# Patient Record
Sex: MALE | Race: OTHER | URBAN - METROPOLITAN AREA
[De-identification: names, ages, dates, MRNs, and addresses within clinical notes are randomized per-mention and may not be internally consistent; named-entity substitution may affect disease eponyms.]

---

## 2024-06-11 ENCOUNTER — OFFICE VISIT (OUTPATIENT)
Age: 29
End: 2024-06-11

## 2024-06-11 DIAGNOSIS — M25.331 CARPAL INSTABILITY OF RIGHT WRIST: Primary | ICD-10-CM

## 2024-06-11 DIAGNOSIS — M25.531 RIGHT WRIST PAIN: ICD-10-CM

## 2024-06-11 DIAGNOSIS — M79.641 RIGHT HAND PAIN: ICD-10-CM

## 2024-06-11 NOTE — PROGRESS NOTES
Orthopaedic Hand Clinic Note    Name: Dustin Abbasi  YOB: 1995  Age: 28 y.o.  Gender: male  MRN: [id number]      CC: Patient referred for evaluation of upper extremity pain    HPI: Dustin Abbasi is a 28 y.o. male Right hand dominant with a chief complaint of right wrist pain, patient has played tennis for over 16 years, he does not remember any acute injury that caused the wrist pain but he reports over the past year he has had worsening pain on the dorsal and radial aspect of the wrist, this hurts mainly to play tennis and sometimes to grasp and  stuff, he rested the wrist for several months while he recovers from arthroscopic knee surgery but he reports that as soon as he went back he started having pain again.      ROS/Meds/PSH/PMH/FH/SH: I personally reviewed the patients standard intake form.  Pertinents are discussed in the HPI    Physical Examination:  General: Awake and alert.  HEENT: Normocephalic, atraumatic  CV/Pulm: Breathing even and unlabored  Skin: No obvious rashes noted.  Lymphatic: No obvious evidence of lymphedema or lymphadenopathy    Musculoskeletal Exam:  Examination on the right upper extremity demonstrates cap refill < 5 seconds in all fingers, tenderness palpation of the radiocarpal joint globally, this is worse on the dorsal and radial aspect especially the anatomic snuffbox, there is pain with scaphoid shift test but no clunks, normal sensation in all fingers, no pain on the TFCC fovea, none of the DRUJ, some discomfort with triquetrum shuck test.    Imaging / Electrodiagnostic Tests:     right Hand XR: AP, Lateral, Oblique and Thumb CMC joint     Clinical Indication:  1. Right wrist pain    2. Right hand pain    3. Carpal instability of right wrist           Report: AP, lateral, oblique and thumb CMC joint hand XRs demonstrates flexed position of the lunate as well as disruption of Gilula's line between the lunate and the triquetrum, this is highly

## 2024-06-19 DIAGNOSIS — M25.331 CARPAL INSTABILITY OF RIGHT WRIST: Primary | ICD-10-CM

## 2024-07-09 ENCOUNTER — OFFICE VISIT (OUTPATIENT)
Age: 29
End: 2024-07-09
Payer: COMMERCIAL

## 2024-07-09 DIAGNOSIS — M65.9 TENOSYNOVITIS: ICD-10-CM

## 2024-07-09 DIAGNOSIS — M19.90 INFLAMMATORY ARTHROPATHY: Primary | ICD-10-CM

## 2024-07-09 DIAGNOSIS — M19.90 INFLAMMATORY ARTHROPATHY: ICD-10-CM

## 2024-07-09 DIAGNOSIS — M25.331: ICD-10-CM

## 2024-07-09 LAB
ALBUMIN SERPL-MCNC: 4.6 G/DL (ref 3.5–5)
ALBUMIN/GLOB SERPL: 1.7 (ref 1–1.9)
ALP SERPL-CCNC: 52 U/L (ref 40–129)
ALT SERPL-CCNC: 22 U/L (ref 12–65)
ANION GAP SERPL CALC-SCNC: 15 MMOL/L (ref 9–18)
AST SERPL-CCNC: 24 U/L (ref 15–37)
BASOPHILS # BLD: 0.1 K/UL (ref 0–0.2)
BASOPHILS NFR BLD: 1 % (ref 0–2)
BILIRUB SERPL-MCNC: 1 MG/DL (ref 0–1.2)
BUN SERPL-MCNC: 16 MG/DL (ref 6–23)
CALCIUM SERPL-MCNC: 9.9 MG/DL (ref 8.8–10.2)
CHLORIDE SERPL-SCNC: 102 MMOL/L (ref 98–107)
CO2 SERPL-SCNC: 24 MMOL/L (ref 20–28)
CREAT SERPL-MCNC: 1.08 MG/DL (ref 0.8–1.3)
DIFFERENTIAL METHOD BLD: ABNORMAL
EOSINOPHIL # BLD: 0.1 K/UL (ref 0–0.8)
EOSINOPHIL NFR BLD: 2 % (ref 0.5–7.8)
ERYTHROCYTE [DISTWIDTH] IN BLOOD BY AUTOMATED COUNT: 12.7 % (ref 11.9–14.6)
ERYTHROCYTE [SEDIMENTATION RATE] IN BLOOD: 1 MM/HR (ref 0–20)
GLOBULIN SER CALC-MCNC: 2.7 G/DL (ref 2.3–3.5)
GLUCOSE SERPL-MCNC: 116 MG/DL (ref 70–99)
HCT VFR BLD AUTO: 49.2 % (ref 41.1–50.3)
HGB BLD-MCNC: 17.6 G/DL (ref 13.6–17.2)
IMM GRANULOCYTES # BLD AUTO: 0 K/UL (ref 0–0.5)
IMM GRANULOCYTES NFR BLD AUTO: 0 % (ref 0–5)
LYMPHOCYTES # BLD: 1.6 K/UL (ref 0.5–4.6)
LYMPHOCYTES NFR BLD: 25 % (ref 13–44)
MCH RBC QN AUTO: 30.1 PG (ref 26.1–32.9)
MCHC RBC AUTO-ENTMCNC: 35.8 G/DL (ref 31.4–35)
MCV RBC AUTO: 84.1 FL (ref 82–102)
MONOCYTES # BLD: 0.3 K/UL (ref 0.1–1.3)
MONOCYTES NFR BLD: 5 % (ref 4–12)
NEUTS SEG # BLD: 4.1 K/UL (ref 1.7–8.2)
NEUTS SEG NFR BLD: 66 % (ref 43–78)
NRBC # BLD: 0 K/UL (ref 0–0.2)
PLATELET # BLD AUTO: 276 K/UL (ref 150–450)
PMV BLD AUTO: 9.5 FL (ref 9.4–12.3)
POTASSIUM SERPL-SCNC: 3.8 MMOL/L (ref 3.5–5.1)
PROT SERPL-MCNC: 7.3 G/DL (ref 6.3–8.2)
RBC # BLD AUTO: 5.85 M/UL (ref 4.23–5.6)
SODIUM SERPL-SCNC: 140 MMOL/L (ref 136–145)
URATE SERPL-MCNC: 7.7 MG/DL (ref 3.9–8.2)
WBC # BLD AUTO: 6.2 K/UL (ref 4.3–11.1)

## 2024-07-09 PROCEDURE — 4004F PT TOBACCO SCREEN RCVD TLK: CPT | Performed by: ORTHOPAEDIC SURGERY

## 2024-07-09 PROCEDURE — G8421 BMI NOT CALCULATED: HCPCS | Performed by: ORTHOPAEDIC SURGERY

## 2024-07-09 PROCEDURE — G8428 CUR MEDS NOT DOCUMENT: HCPCS | Performed by: ORTHOPAEDIC SURGERY

## 2024-07-09 PROCEDURE — 99214 OFFICE O/P EST MOD 30 MIN: CPT | Performed by: ORTHOPAEDIC SURGERY

## 2024-07-10 LAB
ANA SER QL: NEGATIVE
CCP IGA+IGG SERPL IA-ACNC: 6 UNITS (ref 0–19)
CRP SERPL-MCNC: 1 MG/L (ref 0–10)
RHEUMATOID FACT SER QL LA: NEGATIVE

## 2024-07-10 NOTE — PROGRESS NOTES
Orthopaedic Hand Clinic Note    Name: Dustin Abbasi  Age: 29 y.o.  YOB: 1995  Gender: male  MRN: [id number]      Follow up visit:   1. Inflammatory arthropathy    2. Tenosynovitis    3. Carpal instability non-dissociative, right        HPI: Dustin Abbasi is a 29 y.o. male who is following up for right wrist pain, to recap his history, he is an avid , he has been playing for 16 years, over the past year he has had to stop playing tennis due to pain on the right wrist, on the last visit I found evidence of carpal instability on x-ray and ordered an MRI.      ROS/Meds/PSH/PMH/FH/SH: I personally reviewed the patients standard intake form.  Pertinents are discussed in the HPI    Physical Examination:  General: Awake and alert.  HEENT: Normocephalic, atraumatic  CV/Pulm: Breathing even and unlabored  Skin: No obvious rashes noted.  Lymphatic: No obvious evidence of lymphedema or lymphadenopathy    Musculoskeletal Examination:  Examination on the right upper extremity demonstrates cap refill < 5 seconds in all fingers, tenderness palpation of the radiocarpal joint globally, this is worse on the dorsal and radial aspect especially the anatomic snuffbox, there is pain with scaphoid shift test but no clunks, normal sensation in all fingers, no pain on the TFCC fovea, none of the DRUJ, some discomfort with triquetrum shuck test.  Midcarpal shuck is difficult to perform but it does reproduce significant pain    Imaging / Electrodiagnostic Tests:     X-ray was again reviewed, this demonstrates 45 degree flexion of the lunate suggestive of carpal instability    MRI of the right wrist was reviewed, this demonstrates very subtle effusion throughout the carpal joints, no misalignment of the joint is noted on this MRI, there is some excess fluid at the PC form triquetral joint and a small ganglion cyst on the dorsal aspect of the carpus between the capitate and the

## 2024-07-16 LAB — HLA-B27 QL NAA+PROBE: NEGATIVE
